# Patient Record
Sex: MALE | Race: WHITE
[De-identification: names, ages, dates, MRNs, and addresses within clinical notes are randomized per-mention and may not be internally consistent; named-entity substitution may affect disease eponyms.]

---

## 2018-12-04 ENCOUNTER — HOSPITAL ENCOUNTER (OUTPATIENT)
Dept: HOSPITAL 88 - DX | Age: 83
End: 2018-12-04
Attending: OTOLARYNGOLOGY
Payer: MEDICARE

## 2018-12-04 DIAGNOSIS — R13.13: Primary | ICD-10-CM

## 2018-12-04 PROCEDURE — 92611 MOTION FLUOROSCOPY/SWALLOW: CPT

## 2018-12-04 PROCEDURE — 74230 X-RAY XM SWLNG FUNCJ C+: CPT

## 2018-12-04 NOTE — DIAGNOSTIC IMAGING REPORT
PROCEDURE: X-RAY MODIFIED BARIUM SWALLOW



COMPARISON: None. 



INDICATION: Dysphagia



DISCUSSION: Fluoroscopic examination was performed in conjunction with speech

pathology during swallowing a variety of thin and thick liquid consistencies.

No penetration or aspiration is demonstrated. Trace vallecular and base of

tongue residue is noted. 



CONCLUSION: No penetration or aspiration is demonstrated. Please refer to the

speech pathology report for further details.



Signed by: Dr. Rubio Gallegos MD on 12/4/2018 6:23 PM

## 2019-05-15 RX ORDER — LISINOPRIL 20 MG/1
20 TABLET ORAL DAILY
COMMUNITY